# Patient Record
Sex: FEMALE
[De-identification: names, ages, dates, MRNs, and addresses within clinical notes are randomized per-mention and may not be internally consistent; named-entity substitution may affect disease eponyms.]

---

## 2020-11-22 ENCOUNTER — HOSPITAL ENCOUNTER (EMERGENCY)
Dept: HOSPITAL 5 - ED | Age: 25
Discharge: HOME | End: 2020-11-22
Payer: SELF-PAY

## 2020-11-22 VITALS — SYSTOLIC BLOOD PRESSURE: 112 MMHG | DIASTOLIC BLOOD PRESSURE: 68 MMHG

## 2020-11-22 DIAGNOSIS — N39.0: Primary | ICD-10-CM

## 2020-11-22 LAB
ALBUMIN SERPL-MCNC: 3.6 G/DL (ref 3.9–5)
ALT SERPL-CCNC: 11 UNITS/L (ref 7–56)
BACTERIA #/AREA URNS HPF: (no result) /HPF
BAND NEUTROPHILS # (MANUAL): 0 K/MM3
BILIRUB UR QL STRIP: (no result)
BLOOD UR QL VISUAL: (no result)
BUN SERPL-MCNC: 5 MG/DL (ref 7–17)
BUN/CREAT SERPL: 10 %
CALCIUM SERPL-MCNC: 9.2 MG/DL (ref 8.4–10.2)
HCT VFR BLD CALC: 39.1 % (ref 30.3–42.9)
HEMOLYSIS INDEX: 4
HGB BLD-MCNC: 12.8 GM/DL (ref 10.1–14.3)
MCHC RBC AUTO-ENTMCNC: 33 % (ref 30–34)
MCV RBC AUTO: 93 FL (ref 79–97)
MUCOUS THREADS #/AREA URNS HPF: (no result) /HPF
MYELOCYTES # (MANUAL): 0 K/MM3
PH UR STRIP: 6 [PH] (ref 5–7)
PLATELET # BLD: 235 K/MM3 (ref 140–440)
PROMYELOCYTES # (MANUAL): 0 K/MM3
RBC # BLD AUTO: 4.18 M/MM3 (ref 3.65–5.03)
RBC #/AREA URNS HPF: 1 /HPF (ref 0–6)
TOTAL CELLS COUNTED BLD: 100
UROBILINOGEN UR-MCNC: 4 MG/DL (ref ?–2)
WBC #/AREA URNS HPF: 94 /HPF (ref 0–6)

## 2020-11-22 PROCEDURE — 85007 BL SMEAR W/DIFF WBC COUNT: CPT

## 2020-11-22 PROCEDURE — 80053 COMPREHEN METABOLIC PANEL: CPT

## 2020-11-22 PROCEDURE — 87086 URINE CULTURE/COLONY COUNT: CPT

## 2020-11-22 PROCEDURE — 99284 EMERGENCY DEPT VISIT MOD MDM: CPT

## 2020-11-22 PROCEDURE — 96372 THER/PROPH/DIAG INJ SC/IM: CPT

## 2020-11-22 PROCEDURE — 36415 COLL VENOUS BLD VENIPUNCTURE: CPT

## 2020-11-22 PROCEDURE — 76801 OB US < 14 WKS SINGLE FETUS: CPT

## 2020-11-22 PROCEDURE — 81001 URINALYSIS AUTO W/SCOPE: CPT

## 2020-11-22 PROCEDURE — 83690 ASSAY OF LIPASE: CPT

## 2020-11-22 PROCEDURE — 84702 CHORIONIC GONADOTROPIN TEST: CPT

## 2020-11-22 PROCEDURE — 85025 COMPLETE CBC W/AUTO DIFF WBC: CPT

## 2020-11-22 NOTE — EMERGENCY DEPARTMENT REPORT
ED Female  HPI





- General


Chief complaint: Nausea/Vomiting/Diarrhea


Stated complaint: 12WKS PREGNANT ABD PAIN


Time Seen by Provider: 11/22/20 17:29


Source: family


Mode of arrival: Ambulatory


Limitations: Language Barrier





- History of Present Illness


MD Complaint: dysuria, pelvic pain


-: days(s)


Location: suprapubic


Radiation: suprapubic


Severity: mild, moderate


Consistency: constant


Improves with: none


Worsens with: none


Are you Pregnant Now?: Yes





- Related Data


                                  Previous Rx's











 Medication  Instructions  Recorded  Last Taken  Type


 


cephALEXin [Keflex] 500 mg PO Q8HR #21 cap 11/22/20 Unknown Rx











                                    Allergies











Allergy/AdvReac Type Severity Reaction Status Date / Time


 


No Known Allergies Allergy   Unverified 11/22/20 17:25














ED Review of Systems


ROS: 


Stated complaint: 12WKS PREGNANT ABD PAIN


Other details as noted in HPI





Comment: All other systems reviewed and negative





ED Past Medical Hx





- Past Medical History


Previous Medical History?: Yes


Additional medical history: Vaginal delivery x 1





- Surgical History


Past Surgical History?: No





- Social History


Smoking Status: Never Smoker


Substance Use Type: None





- Medications


Home Medications: 


                                Home Medications











 Medication  Instructions  Recorded  Confirmed  Last Taken  Type


 


cephALEXin [Keflex] 500 mg PO Q8HR #21 cap 11/22/20  Unknown Rx














ED Physical Exam





- General


Limitations: Language Barrier


General appearance: alert, in no apparent distress





- Head


Head exam: Present: atraumatic, normocephalic





- Eye


Eye exam: Present: normal appearance





- ENT


ENT exam: Present: mucous membranes moist





- Neck


Neck exam: Present: normal inspection





- Respiratory


Respiratory exam: Present: normal lung sounds bilaterally.  Absent: respiratory 

distress





- Cardiovascular


Cardiovascular Exam: Present: regular rate, normal rhythm.  Absent: systolic 

murmur, diastolic murmur, rubs, gallop





- GI/Abdominal


GI/Abdominal exam: Present: soft, tenderness (Suprapubic region with palpation. 

 Abdomen is soft no distention noted.), normal bowel sounds





- Extremities Exam


Extremities exam: Present: normal inspection





- Back Exam


Back exam: Present: normal inspection





- Neurological Exam


Neurological exam: Present: alert, oriented X3





- Psychiatric


Psychiatric exam: Present: normal affect, normal mood





- Skin


Skin exam: Present: warm, dry, intact, normal color.  Absent: rash





ED Course





                                   Vital Signs











  11/22/20





  17:26


 


Temperature 98.6 F


 


Pulse Rate 100 H


 


Respiratory 20





Rate 


 


Blood Pressure 106/76





[Right] 


 


O2 Sat by Pulse 99





Oximetry 














- Consultations


Consultation #1: 





11/22/20 20:00


Case was discussed with my attending Dr. Coats who evaluated the labs and was 

advised of the of the presentation at the patient plan is to get discharge 

patient home with antibiotics and have her follow-up with OB/GYN





ED Medical Decision Making





- Lab Data


Result diagrams: 


                                 11/22/20 18:18





                                 11/22/20 18:18





- Medical Decision Making





This patient presents to the emergency department with symptoms consistent with 

acute uncomplicated cystitis.  No systemic symptoms.  Not septic.  She is 

well-appearing.  Low suspicion for acute pyelonephritis given the lack of fever,

 CVA tenderness, or systemic features.  Low suspicion for for kidney stone or 

infected stone.  Not in age range for pregnancy and her history and and 

presentation are complicated.  No no indications for labs or imaging at this ti

me.


Critical care attestation.: 


If time is entered above; I have spent that time in minutes in the direct care 

of this critically ill patient, excluding procedure time.








ED Disposition


Clinical Impression: 


 UTI (urinary tract infection)





Disposition: DC-01 TO HOME OR SELFCARE


Is pt being admited?: No


Does the pt Need Aspirin: No


Condition: Stable


Instructions:  Antibiotic Medicine, Adult, Pregnancy and Urinary Tract Infection


Prescriptions: 


cephALEXin [Keflex] 500 mg PO Q8HR #21 cap


Referrals: 


[facility] [Provider Group] - 3-5 Days

## 2020-11-22 NOTE — ULTRASOUND REPORT
ULTRASOUND OBSTETRIC 



INDICATION / CLINICAL INFORMATION:

12 weeks, abd pain.



TECHNIQUE:

Transabdominal.



COMPARISON:

None available.



FINDINGS:

GESTATIONAL SAC: Well-defined oval shape and intrauterine in location. 

YOLK SAC: No significant abnormality.



EMBRYO/FETUS: No significant abnormality. 

- Crown-Rump Length = 7.6 cm = 13 weeks, 4 day(s).

- Fetal Heart Rate, beats per minute (if present) = 169



ADNEXA: No significant abnormality.

FREE FLUID: None.



ADDITIONAL FINDINGS: None.



IMPRESSION:

1. Single, living intrauterine pregnancy with estimated sonographic age of 13 weeks, 4 day(s).



Signer Name: Sree Day MD 

Signed: 11/22/2020 6:38 PM

Workstation Name: [id number]